# Patient Record
Sex: FEMALE | URBAN - METROPOLITAN AREA
[De-identification: names, ages, dates, MRNs, and addresses within clinical notes are randomized per-mention and may not be internally consistent; named-entity substitution may affect disease eponyms.]

---

## 2018-11-20 ENCOUNTER — EMERGENCY (EMERGENCY)
Facility: HOSPITAL | Age: 63
LOS: 1 days | Discharge: HOME | End: 2018-11-20
Attending: EMERGENCY MEDICINE
Payer: MEDICARE

## 2018-11-20 VITALS
SYSTOLIC BLOOD PRESSURE: 129 MMHG | OXYGEN SATURATION: 97 % | TEMPERATURE: 98 F | DIASTOLIC BLOOD PRESSURE: 80 MMHG | HEART RATE: 57 BPM | RESPIRATION RATE: 17 BRPM

## 2018-11-20 VITALS
TEMPERATURE: 98 F | HEART RATE: 64 BPM | RESPIRATION RATE: 18 BRPM | SYSTOLIC BLOOD PRESSURE: 148 MMHG | HEIGHT: 63 IN | OXYGEN SATURATION: 98 % | WEIGHT: 190.04 LBS | DIASTOLIC BLOOD PRESSURE: 91 MMHG

## 2018-11-20 PROCEDURE — 73090 X-RAY EXAM OF FOREARM: CPT

## 2018-11-20 PROCEDURE — 73110 X-RAY EXAM OF WRIST: CPT | Mod: 26,RT

## 2018-11-20 PROCEDURE — 73110 X-RAY EXAM OF WRIST: CPT

## 2018-11-20 PROCEDURE — 99283 EMERGENCY DEPT VISIT LOW MDM: CPT

## 2018-11-20 PROCEDURE — 99284 EMERGENCY DEPT VISIT MOD MDM: CPT

## 2018-11-20 PROCEDURE — 73090 X-RAY EXAM OF FOREARM: CPT | Mod: 26,LT

## 2018-11-20 RX ORDER — IBUPROFEN 200 MG
600 TABLET ORAL ONCE
Qty: 0 | Refills: 0 | Status: COMPLETED | OUTPATIENT
Start: 2018-11-20 | End: 2018-11-20

## 2018-11-20 RX ADMIN — Medication 600 MILLIGRAM(S): at 11:14

## 2018-11-20 NOTE — ED ADULT NURSE NOTE - CAS ELECT INFOMATION PROVIDED
Pt instructed to followup with PMD in 24-48 hours; instructed to followup with orthopedics phone # provided; verbalized understanding.

## 2018-11-20 NOTE — ED ADULT NURSE NOTE - OBJECTIVE STATEMENT
63 y.o F arrived to ED c/o pain to R elbow/ R forearm s/p fall on 10/30/18. A&Ox3. Pt states she had a mechanical fall tripped/fell with impact to R arm. seen at urgent care at that time imaging showing broken nose. imagine of RUE showed no breaks, but has had persistent pain to RUE. pt states pain is better than when fall initially occurred but is persistent. respirations nonlabored pt in no acute distress abdomen soft neuro intact able to move all extremities. RUE +peripheral pulses, cap refill <2seconds, able to move arm and fingers, no numbness or tingling. No CP, SOB< N/V/D, fevers, chills. Safety and comfort provided/maintained.

## 2018-11-20 NOTE — ED ADULT TRIAGE NOTE - CHIEF COMPLAINT QUOTE
Patient came to the ED c/o right arm pain s/p mechanical fall on 10/30/18.  Patient went to urgent care after fall , but has not been able to have her arm evaluated further because of insurance issues.  Patient denies loss of sensation in the right arm and has decreased ROM secondary to pain.  Patient taking Advil for pain with slight relief of pain.

## 2018-11-20 NOTE — ED PROVIDER NOTE - OBJECTIVE STATEMENT
62 y/o f with pmhx as noted below presents for pain on right elbow and right wrist after a fall on October 30th. patient was walking and tripped. went to Urgent care and was seen by a doctor, had xray done and imaging of her head which revealed a broken nose. no broken bones on her arms or legs. still with pain depsite taking Alleve at home, came to ED since she could not follow with an orthopedist. she was scheduled to see an  orthopedist today and was refused because of insurance coverage. she is here with sister at the bedside.

## 2018-11-20 NOTE — ED PROVIDER NOTE - PHYSICAL EXAMINATION
Gen.  no acute distress  HEENT:  perrl eomi   Lungs:  b/l bs  CVS: S1S2   Abd;  soft non tend  Ext: no deformity. pulses 2+ radial b/l  Neuro: aaox3 no focal deficits  MSK: No snuf box tend no tend with axial loadin. min pain with supination and extention of her wrist and arm. no edemna no erythema. no skin changes.

## 2018-11-20 NOTE — ED PROVIDER NOTE - PROGRESS NOTE DETAILS
ATTG: : pain better. placed in shoulder sling. no acute fracture on imaging here. patient to follow closely with ortho. provided with list of tel # including clinic.